# Patient Record
Sex: FEMALE | Race: WHITE
[De-identification: names, ages, dates, MRNs, and addresses within clinical notes are randomized per-mention and may not be internally consistent; named-entity substitution may affect disease eponyms.]

---

## 2020-07-24 ENCOUNTER — HOSPITAL ENCOUNTER (OUTPATIENT)
Dept: HOSPITAL 62 - LC | Age: 25
Discharge: HOME | End: 2020-07-24
Attending: OBSTETRICS & GYNECOLOGY
Payer: MEDICAID

## 2020-07-24 DIAGNOSIS — O47.1: ICD-10-CM

## 2020-07-24 DIAGNOSIS — Z3A.39: ICD-10-CM

## 2020-07-24 DIAGNOSIS — O16.3: Primary | ICD-10-CM

## 2020-07-24 LAB
ALBUMIN SERPL-MCNC: 3.4 G/DL (ref 3.5–5)
ALP SERPL-CCNC: 109 U/L (ref 38–126)
ANION GAP SERPL CALC-SCNC: 7 MMOL/L (ref 5–19)
APPEARANCE UR: (no result)
APTT PPP: YELLOW S
AST SERPL-CCNC: 14 U/L (ref 14–36)
BARBITURATES UR QL SCN: NEGATIVE
BILIRUB DIRECT SERPL-MCNC: 0 MG/DL (ref 0–0.4)
BILIRUB SERPL-MCNC: 0.3 MG/DL (ref 0.2–1.3)
BILIRUB UR QL STRIP: NEGATIVE
BUN SERPL-MCNC: 7 MG/DL (ref 7–20)
CALCIUM: 9.2 MG/DL (ref 8.4–10.2)
CHLORIDE SERPL-SCNC: 108 MMOL/L (ref 98–107)
CO2 SERPL-SCNC: 20 MMOL/L (ref 22–30)
CREAT UR-MCNC: 201.7 MG/DL (ref 16–327)
ERYTHROCYTE [DISTWIDTH] IN BLOOD BY AUTOMATED COUNT: 14.1 % (ref 11.5–14)
GLUCOSE SERPL-MCNC: 78 MG/DL (ref 75–110)
GLUCOSE UR STRIP-MCNC: NEGATIVE MG/DL
HCT VFR BLD CALC: 38 % (ref 36–47)
HGB BLD-MCNC: 13 G/DL (ref 12–15.5)
KETONES UR STRIP-MCNC: NEGATIVE MG/DL
MCH RBC QN AUTO: 28.4 PG (ref 27–33.4)
MCHC RBC AUTO-ENTMCNC: 34.2 G/DL (ref 32–36)
MCV RBC AUTO: 83 FL (ref 80–97)
METHADONE UR QL SCN: NEGATIVE
NITRITE UR QL STRIP: NEGATIVE
PCP UR QL SCN: NEGATIVE
PH UR STRIP: 5 [PH] (ref 5–9)
PLATELET # BLD: 236 10^3/UL (ref 150–450)
POTASSIUM SERPL-SCNC: 4.1 MMOL/L (ref 3.6–5)
PROT SERPL-MCNC: 6.3 G/DL (ref 6.3–8.2)
PROT UR STRIP-MCNC: 30 MG/DL
PROT UR STRIP-MCNC: 8.8 MG/DL (ref ?–12)
RBC # BLD AUTO: 4.58 10^6/UL (ref 3.72–5.28)
SP GR UR STRIP: 1.03
UR PRO/CREAT RATIO RESULT: 0 MG/MG (ref 0–0.2)
URATE SERPL-MCNC: 6.2 MG/DL (ref 2.5–6.2)
URINE AMPHETAMINES SCREEN: NEGATIVE
URINE BENZODIAZEPINES SCREEN: NEGATIVE
URINE COCAINE SCREEN: NEGATIVE
URINE MARIJUANA (THC) SCREEN: (no result)
UROBILINOGEN UR-MCNC: 2 MG/DL (ref ?–2)
WBC # BLD AUTO: 10.3 10^3/UL (ref 4–10.5)

## 2020-07-24 PROCEDURE — 36415 COLL VENOUS BLD VENIPUNCTURE: CPT

## 2020-07-24 PROCEDURE — 80053 COMPREHEN METABOLIC PANEL: CPT

## 2020-07-24 PROCEDURE — 80307 DRUG TEST PRSMV CHEM ANLYZR: CPT

## 2020-07-24 PROCEDURE — G0480 DRUG TEST DEF 1-7 CLASSES: HCPCS

## 2020-07-24 PROCEDURE — 59025 FETAL NON-STRESS TEST: CPT

## 2020-07-24 PROCEDURE — 80349 CANNABINOIDS NATURAL: CPT

## 2020-07-24 PROCEDURE — 84156 ASSAY OF PROTEIN URINE: CPT

## 2020-07-24 PROCEDURE — 82570 ASSAY OF URINE CREATININE: CPT

## 2020-07-24 PROCEDURE — 84550 ASSAY OF BLOOD/URIC ACID: CPT

## 2020-07-24 PROCEDURE — 81005 URINALYSIS: CPT

## 2020-07-24 PROCEDURE — 85027 COMPLETE CBC AUTOMATED: CPT

## 2020-07-24 NOTE — NON STRESS TEST REPORT
=================================================================

Non Stress Test

=================================================================

Datetime Report Generated by CPN: 07/24/2020 16:29

   

   

=================================================================

DEMOGRAPHIC

=================================================================

   

Test Number:  1

   

=================================================================

INDICATION

=================================================================

   

Indication for Study (NST) Other:  PIH workup

   

=================================================================

VITAL SIGNS

=================================================================

   

Temperature - NST:  97.5

Pulse - NST:  88

RESP - NST:  18

NBPSYS NST:  138

NBPDIA NST:  67

   

=================================================================

MONITORING

=================================================================

   

Monitor Explained:  Monitor Explained; Test Explained; Patient

   Verbalized Understanding

Time on Monitor:  07/24/2020 15:13

Time off Monitor:  07/24/2020 16:19

NST Duration:  66

   

=================================================================

NST INTERVENTIONS

=================================================================

   

NST Interventions:  PO Hydration; Reposition Patient

Physician Notified NST:  Dr Taylor

BABY A:  [id number]

   

=================================================================

BABY A

=================================================================

   

Fetal Movement :  Present

Contraction Frequency :  irregular

FHR Baseline :  130

Accelerations :  15X15

Decelerations :  None

Variability :  Moderate 6-25bpm

NST Review:  Meets Criteria for Reactive NST

NST Review and Verified By :  JULISSA Eng RN

NST Results:  Reactive

   

=================================================================

NST REPORT

=================================================================

   

Report Trigger:  Send Report

## 2020-07-29 ENCOUNTER — HOSPITAL ENCOUNTER (INPATIENT)
Dept: HOSPITAL 62 - LR | Age: 25
LOS: 5 days | Discharge: HOME | End: 2020-08-03
Attending: OBSTETRICS & GYNECOLOGY | Admitting: OBSTETRICS & GYNECOLOGY
Payer: MEDICAID

## 2020-07-29 DIAGNOSIS — Z3A.39: ICD-10-CM

## 2020-07-29 DIAGNOSIS — Z87.891: ICD-10-CM

## 2020-07-29 DIAGNOSIS — Z91.048: ICD-10-CM

## 2020-07-29 DIAGNOSIS — O61.0: ICD-10-CM

## 2020-07-29 LAB
ADD MANUAL DIFF: NO
ALBUMIN SERPL-MCNC: 3.6 G/DL (ref 3.5–5)
ALP SERPL-CCNC: 113 U/L (ref 38–126)
ANION GAP SERPL CALC-SCNC: 9 MMOL/L (ref 5–19)
APPEARANCE UR: (no result)
APTT PPP: YELLOW S
AST SERPL-CCNC: 15 U/L (ref 14–36)
BARBITURATES UR QL SCN: NEGATIVE
BASOPHILS # BLD AUTO: 0 10^3/UL (ref 0–0.2)
BASOPHILS NFR BLD AUTO: 0.3 % (ref 0–2)
BILIRUB DIRECT SERPL-MCNC: 0 MG/DL (ref 0–0.4)
BILIRUB SERPL-MCNC: 0.3 MG/DL (ref 0.2–1.3)
BILIRUB UR QL STRIP: NEGATIVE
BUN SERPL-MCNC: 5 MG/DL (ref 7–20)
CALCIUM: 9.1 MG/DL (ref 8.4–10.2)
CHLORIDE SERPL-SCNC: 107 MMOL/L (ref 98–107)
CO2 SERPL-SCNC: 19 MMOL/L (ref 22–30)
CREAT UR-MCNC: 168.3 MG/DL (ref 16–327)
EOSINOPHIL # BLD AUTO: 0.1 10^3/UL (ref 0–0.6)
EOSINOPHIL NFR BLD AUTO: 0.9 % (ref 0–6)
ERYTHROCYTE [DISTWIDTH] IN BLOOD BY AUTOMATED COUNT: 14.4 % (ref 11.5–14)
GLUCOSE SERPL-MCNC: 76 MG/DL (ref 75–110)
GLUCOSE UR STRIP-MCNC: NEGATIVE MG/DL
HCT VFR BLD CALC: 37.3 % (ref 36–47)
HGB BLD-MCNC: 12.8 G/DL (ref 12–15.5)
KETONES UR STRIP-MCNC: NEGATIVE MG/DL
LYMPHOCYTES # BLD AUTO: 1.7 10^3/UL (ref 0.5–4.7)
LYMPHOCYTES NFR BLD AUTO: 17 % (ref 13–45)
MCH RBC QN AUTO: 28.4 PG (ref 27–33.4)
MCHC RBC AUTO-ENTMCNC: 34.3 G/DL (ref 32–36)
MCV RBC AUTO: 83 FL (ref 80–97)
METHADONE UR QL SCN: NEGATIVE
MONOCYTES # BLD AUTO: 0.6 10^3/UL (ref 0.1–1.4)
MONOCYTES NFR BLD AUTO: 6.4 % (ref 3–13)
NEUTROPHILS # BLD AUTO: 7.4 10^3/UL (ref 1.7–8.2)
NEUTS SEG NFR BLD AUTO: 75.4 % (ref 42–78)
NITRITE UR QL STRIP: NEGATIVE
PCP UR QL SCN: NEGATIVE
PH UR STRIP: 5 [PH] (ref 5–9)
PLATELET # BLD: 254 10^3/UL (ref 150–450)
POTASSIUM SERPL-SCNC: 4 MMOL/L (ref 3.6–5)
PROT SERPL-MCNC: 6.5 G/DL (ref 6.3–8.2)
PROT UR STRIP-MCNC: 7.9 MG/DL (ref ?–12)
PROT UR STRIP-MCNC: NEGATIVE MG/DL
RBC # BLD AUTO: 4.5 10^6/UL (ref 3.72–5.28)
SP GR UR STRIP: 1.02
TOTAL CELLS COUNTED % (AUTO): 100 %
UR PRO/CREAT RATIO RESULT: 0 MG/MG (ref 0–0.2)
URATE SERPL-MCNC: 6.7 MG/DL (ref 2.5–6.2)
URINE AMPHETAMINES SCREEN: NEGATIVE
URINE BENZODIAZEPINES SCREEN: NEGATIVE
URINE COCAINE SCREEN: NEGATIVE
URINE MARIJUANA (THC) SCREEN: (no result)
UROBILINOGEN UR-MCNC: 2 MG/DL (ref ?–2)
WBC # BLD AUTO: 9.8 10^3/UL (ref 4–10.5)

## 2020-07-29 PROCEDURE — 83615 LACTATE (LD) (LDH) ENZYME: CPT

## 2020-07-29 PROCEDURE — 84550 ASSAY OF BLOOD/URIC ACID: CPT

## 2020-07-29 PROCEDURE — 85027 COMPLETE CBC AUTOMATED: CPT

## 2020-07-29 PROCEDURE — 84156 ASSAY OF PROTEIN URINE: CPT

## 2020-07-29 PROCEDURE — 85025 COMPLETE CBC W/AUTO DIFF WBC: CPT

## 2020-07-29 PROCEDURE — 86850 RBC ANTIBODY SCREEN: CPT

## 2020-07-29 PROCEDURE — 81001 URINALYSIS AUTO W/SCOPE: CPT

## 2020-07-29 PROCEDURE — 94799 UNLISTED PULMONARY SVC/PX: CPT

## 2020-07-29 PROCEDURE — 86592 SYPHILIS TEST NON-TREP QUAL: CPT

## 2020-07-29 PROCEDURE — 80307 DRUG TEST PRSMV CHEM ANLYZR: CPT

## 2020-07-29 PROCEDURE — G0480 DRUG TEST DEF 1-7 CLASSES: HCPCS

## 2020-07-29 PROCEDURE — 94760 N-INVAS EAR/PLS OXIMETRY 1: CPT

## 2020-07-29 PROCEDURE — 80349 CANNABINOIDS NATURAL: CPT

## 2020-07-29 PROCEDURE — 82570 ASSAY OF URINE CREATININE: CPT

## 2020-07-29 PROCEDURE — C1758 CATHETER, URETERAL: HCPCS

## 2020-07-29 PROCEDURE — 80053 COMPREHEN METABOLIC PANEL: CPT

## 2020-07-29 PROCEDURE — 86900 BLOOD TYPING SEROLOGIC ABO: CPT

## 2020-07-29 PROCEDURE — 86901 BLOOD TYPING SEROLOGIC RH(D): CPT

## 2020-07-29 PROCEDURE — 36415 COLL VENOUS BLD VENIPUNCTURE: CPT

## 2020-07-30 NOTE — L&D PROGRESS NOTES
=================================================================

PROGRESS NOTES

=================================================================

Datetime Report Generated by CPN: 07/30/2020 13:41

   

   

=================================================================

PROGRESS NOTE

=================================================================

   

Impression:  Reassuring Fetal Heart Rate

Procedures:  Sterile Vag Exam

Plan:  Continue Present Management; Induction

Plan Other:  Start Pitocin

Informed Consent Obtained:  Vaginal Delivery; Induction of Labor;

   Risks, Benefits and Alternatives Discussed

Vital Signs :  Reviewed; Within Normal Limits

Comment:  Pitocin infusing, pt feeling contractions but remains

   comfortable. VE, difficult to locate cervix on exam, unable to AROM

   at this time.  Will continue with Pitocin, position changes

   encouraged

   

=================================================================

VAGINAL EXAM

=================================================================

   

Dilatation:  0

Effacement:  50

Station:  -2

Contractions:  rare

   

=================================================================

LAST VAGINAL EXAM-NURSING

=================================================================

   

Nursing Exam Dilitation:  1.0

Nursing Exam Effacement:  80

Nursing Exam Station:  -1

Nursing Exam Contractions:  uterus palpates soft, denies contractions. 

   Reviewed plan with assigned nurse to assess/troubleshoot  Linn

   device before increasing Pitocin

   

=================================================================

MEMBRANES

=================================================================

   

Membranes:  Intact

   

=================================================================

FETUS A

=================================================================

   

FHR - Baseline:  145

Monitoring:  External US

Variability:  Moderate 6-25bpm

Accelerations:  15X15

Decelerations:  None

Fetal Presentation:  Vertex

   

=================================================================

SIGNATURE

=================================================================

   

SIGNATURE:  [id number];14,[id number];10,[id number]

Assignment:  Filipe Harrison MD

Signature:  Electronically signed by Arianna Feliciano CNM on 7/30/2020

   at 13:40  with User ID: [email or web address]

:  Electronically signed by Arianna Feliciano CNM on 7/30/2020 at 13:40 

   with User ID: [email or web address]

## 2020-07-30 NOTE — L&D PROGRESS NOTES
=================================================================

PROGRESS NOTES

=================================================================

Datetime Report Generated by CPN: 07/30/2020 15:52

   

   

=================================================================

PROGRESS NOTE

=================================================================

   

Impression:  Reassuring Fetal Heart Rate

Procedures:  Sterile Vag Exam

Plan:  Induction

Plan Other:  Start Pitocin

Informed Consent Obtained:  Vaginal Delivery; Induction of Labor;

   Risks, Benefits and Alternatives Discussed

Vital Signs :  Reviewed; Within Normal Limits

Comment:  IOL for GHTN, pt doing well, Pitocin infusing. VE tight 1 cm/

   90/ -1, very anterior cervix.  Posiiton changes encouraged, continue

   w/ Pitocin

   

=================================================================

VAGINAL EXAM

=================================================================

   

Dilatation:  0

Effacement:  50

Station:  -2

Contractions:  rare

   

=================================================================

LAST VAGINAL EXAM-NURSING

=================================================================

   

Nursing Exam Dilitation:  1.0

Nursing Exam Effacement:  90

Nursing Exam Station:  -1

Nursing Exam Contractions:  uterus palpates soft, denies contractions. 

   Reviewed plan with assigned nurse to assess/troubleshoot  Linn

   device before increasing Pitocin

   

=================================================================

MEMBRANES

=================================================================

   

Membranes:  Intact

   

=================================================================

FETUS A

=================================================================

   

FHR - Baseline:  150

Monitoring:  External US

Variability:  Moderate 6-25bpm

Accelerations:  15X15

Decelerations:  None

Fetal Presentation:  Vertex

   

=================================================================

SIGNATURE

=================================================================

   

SIGNATURE:  [id number];14,[id number];13,[id number]

Assignment:  Filipe Harrison MD

Signature:  Electronically signed by Arianna Feliciano CNM on 7/30/2020

   at 15:51  with User ID: [email or web address]

:  Electronically signed by Arianna Feliciano CNM on 7/30/2020 at 15:51 

   with User ID: [email or web address]

## 2020-07-30 NOTE — L&D PROGRESS NOTES
=================================================================

PROGRESS NOTES

=================================================================

Datetime Report Generated by CPN: 2020 08:49

   

   

=================================================================

PROGRESS NOTE

=================================================================

   

Impression:  Reassuring Fetal Heart Rate

Procedures:  Sterile Vag Exam

Plan:  Induction

Plan Other:  Start Pitocin

Informed Consent Obtained:  Vaginal Delivery; Induction of Labor;

   Risks, Benefits and Alternatives Discussed

Vital Signs :  Reviewed; Within Normal Limits

Comment:   at 39.5 here for IOL due to GHTN. s/p Cervidil

   overnight. Pt doing well this morning. No complaints. GBS negative.

   VE FT/80/-1, vtx, soft cervix. Pt unsure of epidural when in active

   labor. Pain management options reviewed. Plan to start Pitocin, then

   AROM w/ cervical change. Attending MD is Dr Harrison today

   

=================================================================

VAGINAL EXAM

=================================================================

   

Dilatation:  0

Effacement:  50

Station:  -2

Contractions:  rare

   

=================================================================

LAST VAGINAL EXAM-NURSING

=================================================================

   

Nursing Exam Dilitation:  1.0

Nursing Exam Effacement:  80

Nursing Exam Station:  -1

Nursing Exam Contractions:  Patient denies contractions and pain at

   this time. 

   

=================================================================

MEMBRANES

=================================================================

   

Membranes:  Intact

   

=================================================================

FETUS A

=================================================================

   

FHR - Baseline:  145

Monitoring:  External US

Variability:  Moderate 6-25bpm

Accelerations:  15X15

Decelerations:  None

Fetal Presentation:  Vertex

   

=================================================================

SIGNATURE

=================================================================

   

SIGNATURE:  [id number];14,[id number];13,[id number]

Assignment:  Winnie Bustamante MD

Signature:  Electronically signed by Arianna Feliciano CNM on 2020

   at 08:48  with User ID: [email or web address]

:  Electronically signed by Arianna Feliciano CNM on 2020 at 08:48 

   with User ID: [email or web address]

## 2020-07-31 LAB
ADD MANUAL DIFF: NO
ALBUMIN SERPL-MCNC: 3.3 G/DL (ref 3.5–5)
ALP SERPL-CCNC: 113 U/L (ref 38–126)
ANION GAP SERPL CALC-SCNC: 7 MMOL/L (ref 5–19)
AST SERPL-CCNC: 14 U/L (ref 14–36)
BASOPHILS # BLD AUTO: 0 10^3/UL (ref 0–0.2)
BASOPHILS NFR BLD AUTO: 0.2 % (ref 0–2)
BILIRUB DIRECT SERPL-MCNC: 0 MG/DL (ref 0–0.4)
BILIRUB SERPL-MCNC: 0.5 MG/DL (ref 0.2–1.3)
BUN SERPL-MCNC: 7 MG/DL (ref 7–20)
CALCIUM: 8.9 MG/DL (ref 8.4–10.2)
CHLORIDE SERPL-SCNC: 107 MMOL/L (ref 98–107)
CO2 SERPL-SCNC: 20 MMOL/L (ref 22–30)
EOSINOPHIL # BLD AUTO: 0.1 10^3/UL (ref 0–0.6)
EOSINOPHIL NFR BLD AUTO: 0.8 % (ref 0–6)
ERYTHROCYTE [DISTWIDTH] IN BLOOD BY AUTOMATED COUNT: 14 % (ref 11.5–14)
GLUCOSE SERPL-MCNC: 78 MG/DL (ref 75–110)
HCT VFR BLD CALC: 38 % (ref 36–47)
HGB BLD-MCNC: 13.2 G/DL (ref 12–15.5)
LYMPHOCYTES # BLD AUTO: 1.4 10^3/UL (ref 0.5–4.7)
LYMPHOCYTES NFR BLD AUTO: 15.1 % (ref 13–45)
MCH RBC QN AUTO: 28.4 PG (ref 27–33.4)
MCHC RBC AUTO-ENTMCNC: 34.7 G/DL (ref 32–36)
MCV RBC AUTO: 82 FL (ref 80–97)
MONOCYTES # BLD AUTO: 0.6 10^3/UL (ref 0.1–1.4)
MONOCYTES NFR BLD AUTO: 5.9 % (ref 3–13)
NEUTROPHILS # BLD AUTO: 7.4 10^3/UL (ref 1.7–8.2)
NEUTS SEG NFR BLD AUTO: 78 % (ref 42–78)
PLATELET # BLD: 231 10^3/UL (ref 150–450)
POTASSIUM SERPL-SCNC: 4 MMOL/L (ref 3.6–5)
PROT SERPL-MCNC: 6.3 G/DL (ref 6.3–8.2)
RBC # BLD AUTO: 4.65 10^6/UL (ref 3.72–5.28)
TOTAL CELLS COUNTED % (AUTO): 100 %
URATE SERPL-MCNC: 6.2 MG/DL (ref 2.5–6.2)
WBC # BLD AUTO: 9.5 10^3/UL (ref 4–10.5)

## 2020-07-31 PROCEDURE — 3E033VJ INTRODUCTION OF OTHER HORMONE INTO PERIPHERAL VEIN, PERCUTANEOUS APPROACH: ICD-10-PCS | Performed by: OBSTETRICS & GYNECOLOGY

## 2020-07-31 PROCEDURE — 3E0P7VZ INTRODUCTION OF HORMONE INTO FEMALE REPRODUCTIVE, VIA NATURAL OR ARTIFICIAL OPENING: ICD-10-PCS | Performed by: OBSTETRICS & GYNECOLOGY

## 2020-07-31 NOTE — L&D PROGRESS NOTES
=================================================================

PROGRESS NOTES

=================================================================

Datetime Report Generated by CPN: 07/31/2020 15:29

   

   

=================================================================

PROGRESS NOTE

=================================================================

   

Impression:  Reassuring Fetal Heart Rate

Impression Other:  not in labor

Procedures:  Sterile Vag Exam

Procedures- Other:  removed hough balloon and turned off pitocin

Plan:  Induction

Plan Other:  Start Pitocin

Informed Consent Obtained:  Vaginal Delivery; Induction of Labor;

   Risks, Benefits and Alternatives Discussed

Vital Signs :  Reviewed; Within Normal Limits

Comment:  after 5 hours of hough balloon and 3 hours of pitocin, no

   cervical change and only aftab rarely. FB deflated and

   removed. pitocin turned off. BP normal and no sx pre-e.  P:cytotec,

   discuss with dr england

   

=================================================================

VAGINAL EXAM

=================================================================

   

Dilatation:  1

Effacement:  90

Station:  -2

Contractions:  rare

   

=================================================================

LAST VAGINAL EXAM-NURSING

=================================================================

   

Nursing Exam Dilitation:  1.0

Nursing Exam Effacement:  90

Nursing Exam Station:  -2

Nursing Exam Contractions:  UTD due to wireless monitor not  working;

   RN at bedside troubleshooting monitor 

   

=================================================================

MEMBRANES

=================================================================

   

Membranes:  Bulging

   

=================================================================

FETUS A

=================================================================

   

FHR - Baseline:  140

Monitoring:  External US

Variability:  Moderate 6-25bpm

Accelerations:  10X10

Decelerations:  None

FHR Category:  Category I

:  40.0

Fetal Presentation:  Vertex

   

=================================================================

SIGNATURE

=================================================================

   

SIGNATURE:  13,[id number];14,[id number];10,[id number]

Assignment:  Iris England MD

Signature:  Electronically signed by Isela Tafoya CNM  on 7/31/2020 at

   15:28  with User ID: [email or web address]

:  Electronically signed by Isela Tafoya CNM  on 7/31/2020 at 15:28  with

   User ID: [email or web address]

## 2020-08-01 RX ADMIN — DOCUSATE SODIUM SCH MG: 100 CAPSULE, LIQUID FILLED ORAL at 17:19

## 2020-08-01 RX ADMIN — KETOROLAC TROMETHAMINE SCH MG: 30 INJECTION, SOLUTION INTRAMUSCULAR at 14:44

## 2020-08-01 RX ADMIN — KETOROLAC TROMETHAMINE SCH MG: 30 INJECTION, SOLUTION INTRAMUSCULAR at 21:48

## 2020-08-01 RX ADMIN — DOCUSATE SODIUM SCH: 100 CAPSULE, LIQUID FILLED ORAL at 11:40

## 2020-08-01 RX ADMIN — Medication SCH: at 11:40

## 2020-08-01 NOTE — OPERATIVE REPORT
Operative Report


DATE OF SURGERY: 20


PREOPERATIVE DIAGNOSIS: IUP @ 40 1/7 wks, gestational hypertension, failed estella

ction


POSTOPERATIVE DIAGNOSIS: same


OPERATION: Primary low transverse hysterotomy  section


SURGEON: LORETTA MESA


ANESTHESIA: Spinal


COMPLICATIONS: 





None


ESTIMATED BLOOD LOSS: 800 cc


INTRAOPERATIVE FINDINGS: Infant cephalic presentation Apgars of 8 and 9


PROCEDURE: 








PROCEDURE IN DETAIL:


The patient was taken to the operating room, prepared and draped in a


normal sterile fashion in a supine position with a leftward tilt. A


transverse skin incision was made with a scalpel and carried through to


the underlying layer of fascia with the same scalpel. The fascia was


excised in the midline and extended laterally with Rivka. The fascia was


then dissected from the rectus muscle sharply with Rivka and the rectus


muscle was divided and the peritoneal cavity was entered sharply with the


same Metzenbaum. With good visualization of the bladder and the uterus


the bladder blade was inserted. The hysterotomy was nicked with a scalpel


and extended laterally with surgeon finger fraction. The infant was then


delivered atraumatically. The nose and mouth were suctioned with a


suction bulb, the cord was clamped and cut and handed off to awaiting


pediatricians. Cord blood was collected. The placenta was removed


manually. The uterus was exteriorized and cleared of clots and debris.


The hysterotomy was closed with 0 Monocryl in a running, locked fashion.


A second layer of the same suture was used to imbricate to ensure


hemostasis. The uterus was returned to the abdomen and peritoneal cavity


was cleared of clots and debris. The rectus muscle and peritoneum were


repaired with mattress stitch of 2-0 Chromic. The fascia was closed with


0-Vicryl. The subcutaneous layer was closed with plain catgut and the


skin was closed with 4-0 Vicryl. The patient tolerated the procedure


well. Sponge, lap, and needle counts correct x2 and the patient was taken


to recovery in stable condition.

## 2020-08-01 NOTE — DELIVERY SUMMARY
=================================================================

Del Sum A-C

=================================================================

Datetime Report Generated by CPN: 2020 19:38

   

   

=================================================================

DELIVERY PERSONNEL

=================================================================

   

DELIVERY PERSONNEL:  [id number]

Delivery Doctor::  Iris Craig MD

Anesthesiologist::  Margie Patricio MD

CRNA::  Oniel Beyer CRNA

Labor and Delivery Nurse::  Magdalena Miramontes RN

Circulator::  Magdalena Miramontes RN

 Nurse Practitioner::  JEWEL Reyes

Nursery Nurse::  Darline Pham RN

Scrub Tech/CNA:  ST Ervin

Scrub Tech/CNA:  Anabela Coe CST

Additional Personnel: :  Trudy Meyers RN

   

=================================================================

MATERNAL INFORMATION

=================================================================

   

Delivery Anesthesia:  Spinal

Medications After Delivery:  Pitocin 30 Units in 500ml NS/D5W

Delivery QBL:  820

Maternal Complications:  Other

Other Maternal Complications:  Failed Induction

   

=================================================================

LABOR SUMMARY

=================================================================

   

EDC:  2020 00:00

No. Babies in Womb:  1

 Attempted:  No

Labor Anesthesia:  None

   

=================================================================

LABOR INFORMATION

=================================================================

   

Reason for Induction:  Gestational Hypertension

Cervical Ripening Agents:  Cervidil; Ramires Balloon; Cytotec @

Other Ripening Agents:  cervidil removed by pt 

      

Oxytocin:  Induction

Group B Beta Strep:  Negative

Antibiotics # of Doses:  0

Antibiotics Time of Last Dose:  N/A

Name of Antibiotic Given:  N/A

Steroids Given:  None

Reason Steroids Not Administered:  Not Applicable

   

=================================================================

MEMBRANES

=================================================================

   

Membranes Rupture Method:  Spontaneous

Rupture of Membranes:  2020 00:11

Length of Rupture (hr):  6.23

Amniotic Fluid Color:  Clear

Amniotic Fluid Amount:  Small

   

=================================================================

STAGES OF LABOR

=================================================================

   

Stage 3 hr:  0

Stage 3 min:  1

   

=================================================================

VAGINAL DELIVERY

=================================================================

   

Episiotomy:  None

Laceration #1:  None

Laceration Extension #1:  N/A

Laceration Repair:  Not Applicable

Sponge Count Correct:  N/A

Sharps Count Correct:  N/A

   

=================================================================

CSECTION DELIVERY

=================================================================

   

Primary Indication:  Failed Induction

CSection Urgency:  Non-Scheduled

CSection Incidence:  Primary

Labor:  No Labor

Elective:  Nonelective

CSection Incision:  Lower Uterine Transverse

   

=================================================================

BABY A INFORMATION

=================================================================

   

Infant Delivery Date/Time:  2020 06:25

Method of Delivery:  

Nurse Controlled Delivery:  No

Born in Route :  No

:  N/A

Forceps:  N/A

Vacuum Extraction:  N/A

Shoulder Dystocia :  No

   

=================================================================

PRESENTATION/POSITION BABY A

=================================================================

   

Presentation:  Cephalic

Cephalic Presentation:  Vertex

Breech Presentation:  N/A

   

=================================================================

PLACENTA INFORMATION BABY A

=================================================================

   

Placenta Delivery Time :  2020 06:26

Placenta Method of Delivery:  Manual Removal

Placenta Status:  Delivered

   

=================================================================

APGAR SCORES BABY A

=================================================================

   

Heart Rate 1 min:  >100 bpm

Resp Effort 1 min:  Good Cry

Reflex Irritability 1 min:  Cough or Sneeze or Pulls Away

Muscle Tone 1 min:  Active Motion

Color 1 min:  Body Pink, Extremities Blue

APGAR SCORE 1 MIN:  9

Heart Rate 5 min:  >100 bpm

Resp Effort 5 min:  Good Cry

Reflex Irritability 5 min:  Cough or Sneeze or Pulls Away

Muscle Tone 5 min:  Active Motion

Color 5 min:  Body Pink, Extremities Blue

APGAR SCORE 5 MIN:  9

   

=================================================================

INFANT INFORMATION BABY A

=================================================================

   

Gestational Age at Delivery:  40.0

Gestational Status:  Full Term- 39- 40.6 Weeks

Infant Outcome :  Liveborn

Infant Condition :  Stable

Infant Sex:  Male

   

=================================================================

IDENTIFICATION BABY A

=================================================================

   

Infant Verification Date/Time:  2020 06:39

ID Band Number:  [id number]

Mother's Name Verified:  Yes

Infant Medical Record Number:  [id number]

RN Verifying Infant:  NONI FlemingAxel HERNANDEZ

Additional Verifying Personnel:  ROMEO Meyers RN

   

=================================================================

WEIGHT/LENGTH BABY A

=================================================================

   

Infant Birthweight (gm):  2920

Infant Weight (lb):  6

Infant Weight (oz):  7

Infant Length (in):  19.00

Infant Length (cm):  48.26

   

=================================================================

CORD INFORMATION BABY A

=================================================================

   

No. Cord Vessels:  3

Nuchal Cord :  N/A

Cord Blood Taken:  Yes-For Eval (Mom's Blood Type - or O+)

Infant Suction:  Mouth; Nose

   

=================================================================

ASSESSMENT BABY A

=================================================================

   

Infant Complications:  None

Physical Findings at Delivery:  Within Normal Limits

Physical Findings- Other:  See full nursery RN assessment

Infant Respirations:  Appears Normal

Neonatologist/ALS Called :  No

Infant Care By:  JESUS Pham RN and JESUS Vaughan NNP

Transferred To:  [facility]

   

=================================================================

BABY B INFORMATION

=================================================================

   

 :  N/A

   

=================================================================

SIGNATURES

=================================================================

   

:  I was personally available for consultation and serving as

   supervising physician for the MLP.

## 2020-08-01 NOTE — PDOC PROGRESS REPORT
Subjective


Progress Note for:: 20


Subjective:: 





Indication for procedure.  IUP at 40 weeks and 1 day stational hypertension.  

Patient had been present in the L&D for approximately 3 days and had had 

multiple attempts at inducing her labor which were not successful.  Patient 

underwent Cervidil for ripening x2 a 12-hour trial of Pitocin as well as another

12-hour trial of Pitocin with Ramires bulb in place she then had 2 doses of 

Cytotec for cervical ripening brains ruptured she failed to progress in labor 

passed a 1 cm dilation of the cervix.  The options were discussed with the 

patient and she and her  opted for  section understanding the 

risk involved with surgery and the likely need for repeat  with 

subsequent childbearing based on her lack of response to induction efforts with 

this pregnancy


Reason For Visit: 


PREGNANCY/INDUCTION 


Addendum to operative report








Result


Laboratory Results: 


                                        





                                 20 11:53 





                                 20 11:53 





                                        











  20





  11:53 11:53


 


WBC  9.5 


 


RBC  4.65 


 


Hgb  13.2 


 


Hct  38.0 


 


MCV  82 


 


MCH  28.4 


 


MCHC  34.7 


 


RDW  14.0 


 


Plt Count  231 


 


Seg Neutrophils %  78.0 


 


Sodium   133.7 L


 


Potassium   4.0


 


Chloride   107


 


Carbon Dioxide   20 L


 


Anion Gap   7


 


BUN   7


 


Creatinine   0.43 L


 


Est GFR (African Amer)   > 60


 


Glucose   78


 


Uric Acid   6.2


 


Calcium   8.9


 


Total Bilirubin   0.5


 


AST   14


 


Alkaline Phosphatase   113


 


Total Protein   6.3


 


Albumin   3.3 L














Assessment & Plan





- Time


Time Spent with patient: 15-24 minutes


Anticipated discharge: Home


Anticipated DC Timeframe: within 48 hours

## 2020-08-02 LAB
ERYTHROCYTE [DISTWIDTH] IN BLOOD BY AUTOMATED COUNT: 14.2 % (ref 11.5–14)
HCT VFR BLD CALC: 24.8 % (ref 36–47)
HGB BLD-MCNC: 8.4 G/DL (ref 12–15.5)
MCH RBC QN AUTO: 28.4 PG (ref 27–33.4)
MCHC RBC AUTO-ENTMCNC: 33.9 G/DL (ref 32–36)
MCV RBC AUTO: 84 FL (ref 80–97)
PLATELET # BLD: 179 10^3/UL (ref 150–450)
RBC # BLD AUTO: 2.96 10^6/UL (ref 3.72–5.28)
WBC # BLD AUTO: 8.6 10^3/UL (ref 4–10.5)

## 2020-08-02 RX ADMIN — IBUPROFEN SCH MG: 800 TABLET, FILM COATED ORAL at 15:08

## 2020-08-02 RX ADMIN — DOCUSATE SODIUM SCH MG: 100 CAPSULE, LIQUID FILLED ORAL at 18:25

## 2020-08-02 RX ADMIN — FERROUS SULFATE TAB 325 MG (65 MG ELEMENTAL FE) SCH MG: 325 (65 FE) TAB at 18:25

## 2020-08-02 RX ADMIN — IBUPROFEN SCH: 800 TABLET, FILM COATED ORAL at 18:36

## 2020-08-02 RX ADMIN — DOCUSATE SODIUM SCH MG: 100 CAPSULE, LIQUID FILLED ORAL at 10:20

## 2020-08-02 RX ADMIN — IBUPROFEN SCH MG: 800 TABLET, FILM COATED ORAL at 21:33

## 2020-08-02 RX ADMIN — IBUPROFEN SCH MG: 800 TABLET, FILM COATED ORAL at 05:34

## 2020-08-02 RX ADMIN — Medication SCH CAP: at 10:20

## 2020-08-02 NOTE — PDOC PROGRESS REPORT
Subjective-OB


Progress Note for:: 20


Subjective: 





reports bleeding slowing, pain controlled with current meds. denies needs, 

+passing gas





Physical Exam (OB)


Vital Signs: 


                                        











Temp Pulse Resp BP Pulse Ox


 


 97.9 F   100   16   135/79 H  100 


 


 20 08:20  20 08:20  20 08:20  20 08:20  20 08:20








                                 Intake & Output











 20





 06:59 06:59 06:59


 


Intake Total  1000 500


 


Output Total  1550 


 


Balance  -550 500














- 


Dressing Removed: No - drainage outlined


Incision: Dressing


Closure Type: opsite





- Abdomen


Description: Tender, Soft


Hernia Present: No


Fundal Description: Firm, Midline


Fundal Height: u/u - u/2





- Abdominal


Distension: No distension





- Extremities


Lower extremities: Larry's sign - neg


Calf: Normal, Nontender





Objective-Diagnostic


Laboratory: 


                                        





                                 20 06:03 





                                 20 11:53 





                                        











  20





  06:03


 


WBC  8.6


 


RBC  2.96 L


 


Hgb  8.4 L D


 


Hct  24.8 L


 


MCV  84


 


MCH  28.4


 


MCHC  33.9


 


RDW  14.2 H


 


Plt Count  179














Assessment and Plan(PN)





- Assessment and Plan


(1) Failed induction of labor, delivered


Is this a current diagnosis for this admission?: Yes   





(2) Gestational hypertension


Qualifiers: 


   Trimester: unspecified trimester   Qualified Code(s): O13.9 - Gestational 

[pregnancy-induced] hypertension without significant proteinuria, unspecified 

trimester   


Is this a current diagnosis for this admission?: Yes   





- Time Spent with Patient


Time with patient: Less than 15 minutes





- Disposition


Anticipated Discharge Disposition: Home, Self Care


Anticipated Discharge Timeframe: within 48 hours

## 2020-08-03 VITALS — DIASTOLIC BLOOD PRESSURE: 80 MMHG | SYSTOLIC BLOOD PRESSURE: 148 MMHG

## 2020-08-03 LAB
ADD MANUAL DIFF: NO
BASOPHILS # BLD AUTO: 0 10^3/UL (ref 0–0.2)
BASOPHILS NFR BLD AUTO: 0.4 % (ref 0–2)
EOSINOPHIL # BLD AUTO: 0.2 10^3/UL (ref 0–0.6)
EOSINOPHIL NFR BLD AUTO: 2.3 % (ref 0–6)
ERYTHROCYTE [DISTWIDTH] IN BLOOD BY AUTOMATED COUNT: 14.6 % (ref 11.5–14)
HCT VFR BLD CALC: 23.9 % (ref 36–47)
HGB BLD-MCNC: 8.2 G/DL (ref 12–15.5)
LYMPHOCYTES # BLD AUTO: 2.1 10^3/UL (ref 0.5–4.7)
LYMPHOCYTES NFR BLD AUTO: 24.2 % (ref 13–45)
MCH RBC QN AUTO: 28.5 PG (ref 27–33.4)
MCHC RBC AUTO-ENTMCNC: 34.4 G/DL (ref 32–36)
MCV RBC AUTO: 83 FL (ref 80–97)
MONOCYTES # BLD AUTO: 0.5 10^3/UL (ref 0.1–1.4)
MONOCYTES NFR BLD AUTO: 5.6 % (ref 3–13)
NEUTROPHILS # BLD AUTO: 5.9 10^3/UL (ref 1.7–8.2)
NEUTS SEG NFR BLD AUTO: 67.5 % (ref 42–78)
PLATELET # BLD: 216 10^3/UL (ref 150–450)
RBC # BLD AUTO: 2.89 10^6/UL (ref 3.72–5.28)
TOTAL CELLS COUNTED % (AUTO): 100 %
WBC # BLD AUTO: 8.7 10^3/UL (ref 4–10.5)

## 2020-08-03 RX ADMIN — FERROUS SULFATE TAB 325 MG (65 MG ELEMENTAL FE) SCH MG: 325 (65 FE) TAB at 09:30

## 2020-08-03 RX ADMIN — IBUPROFEN SCH MG: 800 TABLET, FILM COATED ORAL at 03:02

## 2020-08-03 RX ADMIN — Medication SCH CAP: at 09:29

## 2020-08-03 RX ADMIN — DOCUSATE SODIUM SCH MG: 100 CAPSULE, LIQUID FILLED ORAL at 09:30

## 2020-08-03 RX ADMIN — IBUPROFEN SCH MG: 800 TABLET, FILM COATED ORAL at 09:29

## 2020-08-03 NOTE — PDOC DISCHARGE SUMMARY
Impression





- Admit/DC Date/PCP


Admission Date/Primary Care Provider: 


  20 17:31





  GABE REID MD





Discharge Date: 20





- Discharge Diagnosis


(1)  delivery delivered


Is this a current diagnosis for this admission?: Yes   





(2) Failed induction of labor, delivered


Is this a current diagnosis for this admission?: Yes   





(3) Gestational hypertension


Is this a current diagnosis for this admission?: Yes   





- Additional Information


Resuscitation Status: Full Code


Discharge Diet: Regular


Discharge Activity: Balance Activity w/Rest, No Lifting Over 10 Pounds, No 

Lifting/Push/Pulling, Pelvic Rest, No tub bath


Referrals: 


GABE REID MD [Primary Care Provider] -  (Follow up in 1 week.  Call the 

office and make an appointment.  )


Prescriptions: 


Oxycodone HCl/Acetaminophen [Percocet 5-325 mg Tablet] 1 tab PO Q4HP PRN #30 

tablet


 PRN Reason: For Pain Scale 3-5


Ibuprofen [Motrin 800 mg Tablet] 800 mg PO Q8HP PRN #60 tablet


 PRN Reason: 


Home Medications: 








Prenatal 95/Iron Fum/Folic/Dha [Prenatal + Dha Combo Pack] 1 each PO DAILY 

18 


Calcium Phosphate Trib/Vit D3 [Calcium + Vitamin D3 Gummies] 1 tab PO DAILY 

20 


Ibuprofen [Motrin 800 mg Tablet] 800 mg PO Q8HP PRN #60 tablet 20 


Oxycodone HCl/Acetaminophen [Percocet 5-325 mg Tablet] 1 tab PO Q4HP PRN #30 

tablet 20 











HPI


Reason(s) for Admission: Induction of Labor, PIH


Prenatal Procedures: NST


Intrapartum Procedure(s): Spontaneous Vaginal Delivery, : Low Cervical, 

Transverse





Results


Laboratory Results: 


                                        











WBC  8.7 10^3/uL (4.0-10.5)   20  06:59    


 


RBC  2.89 10^6/uL (3.72-5.28)  L  20  06:59    


 


Hgb  8.2 g/dL (12.0-15.5)  L  20  06:59    


 


Hct  23.9 % (36.0-47.0)  L  20  06:59    


 


MCV  83 fl (80-97)   20  06:59    


 


MCH  28.5 pg (27.0-33.4)   20  06:59    


 


MCHC  34.4 g/dL (32.0-36.0)   20  06:59    


 


RDW  14.6 % (11.5-14.0)  H  20  06:59    


 


Plt Count  216 10^3/uL (150-450)   20  06:59    


 


Lymph % (Auto)  24.2 % (13-45)   20  06:59    


 


Mono % (Auto)  5.6 % (3-13)   20  06:59    


 


Eos % (Auto)  2.3 % (0-6)   20  06:59    


 


Baso % (Auto)  0.4 % (0-2)   20  06:59    


 


Absolute Neuts (auto)  5.9 10^3/uL (1.7-8.2)   20  06:59    


 


Absolute Lymphs (auto)  2.1 10^3/uL (0.5-4.7)   20  06:59    


 


Absolute Monos (auto)  0.5 10^3/uL (0.1-1.4)   20  06:59    


 


Absolute Eos (auto)  0.2 10^3/uL (0.0-0.6)   20  06:59    


 


Absolute Basos (auto)  0.0 10^3/uL (0.0-0.2)   20  06:59    


 


Seg Neutrophils %  67.5 % (42-78)   20  06:59    


 


Sodium  133.7 mmol/L (137-145)  L  20  11:53    


 


Potassium  4.0 mmol/L (3.6-5.0)   20  11:53    


 


Chloride  107 mmol/L ()   20  11:53    


 


Carbon Dioxide  20 mmol/L (22-30)  L  20  11:53    


 


Anion Gap  7  (5-19)   20  11:53    


 


BUN  7 mg/dL (7-20)   20  11:53    


 


Creatinine  0.43 mg/dL (0.52-1.25)  L  20  11:53    


 


Est GFR ( Amer)  > 60  (>60)   20  11:53    


 


Est GFR (MDRD) Non-Af  > 60  (>60)   20  11:53    


 


Glucose  78 mg/dL ()   20  11:53    


 


Uric Acid  6.2 mg/dL (2.5-6.2)   20  11:53    


 


Calcium  8.9 mg/dL (8.4-10.2)   20  11:53    


 


Total Bilirubin  0.5 mg/dL (0.2-1.3)   20  11:53    


 


Direct Bilirubin  0.0 mg/dL (0.0-0.4)   20  11:53    


 


Neonat Total Bilirubin  Not Reportable   20  11:53    


 


Neonat Direct Bilirubin  Not Reportable   20  11:53    


 


Neonat Indirect Bili  Not Reportable   20  11:53    


 


AST  14 U/L (14-36)   20  11:53    


 


ALT  7 U/L (<35)   20  11:53    


 


Alkaline Phosphatase  113 U/L ()   20  11:53    


 


Lactate Dehydrogenase  160 U/L (120-246)   20  11:53    


 


Total Protein  6.3 g/dL (6.3-8.2)   20  11:53    


 


Albumin  3.3 g/dL (3.5-5.0)  L  20  11:53    


 


Urine Color  YELLOW   20  17:40    


 


Urine Appearance  CLOUDY   20  17:40    


 


Urine pH  5.0  (5.0-9.0)   20  17:40    


 


Ur Specific Gravity  1.020   20  17:40    


 


Urine Protein  NEGATIVE mg/dL (NEGATIVE)   20  17:40    


 


Urine Glucose (UA)  NEGATIVE mg/dL (NEGATIVE)   20  17:40    


 


Urine Ketones  NEGATIVE mg/dL (NEGATIVE)   20  17:40    


 


Urine Blood  NEGATIVE  (NEGATIVE)   20  17:40    


 


Urine Nitrite  NEGATIVE  (NEGATIVE)   20  17:40    


 


Urine Bilirubin  NEGATIVE  (NEGATIVE)   20  17:40    


 


Urine Urobilinogen  2.0 mg/dL (<2.0)  H  20  17:40    


 


Ur Leukocyte Esterase  NEGATIVE  (NEGATIVE)   20  17:40    


 


Urine WBC (Auto)  5 /HPF  20  17:40    


 


Urine RBC (Auto)  6 /HPF  20  17:40    


 


Urine Bacteria (Auto)  2+ /HPF  20  17:40    


 


Squamous Epi Cells Auto  13 /HPF  20  17:40    


 


Urine Mucus (Auto)  FEW /LPF  20  17:40    


 


Urine Creatinine  168.3 mg/dL ()   20  17:40    


 


Protein/Creatinin Ratio  0.0 mg/mg (0.0-0.2)   20  17:40    


 


Urine Total Protein  7.9 mg/dL (<12)   20  17:40    


 


Urine Ascorbic Acid  NEGATIVE  (NEGATIVE)   20  17:40    


 


Urine Opiates Screen  NEGATIVE   20  17:40    


 


Urine Methadone Screen  NEGATIVE   20  17:40    


 


Ur Barbiturates Screen  NEGATIVE   20  17:40    


 


Ur Phencyclidine Scrn  NEGATIVE   20  17:40    


 


Ur Amphetamines Screen  NEGATIVE   20  17:40    


 


U Benzodiazepines Scrn  NEGATIVE   20  17:40    


 


Urine Cocaine Screen  NEGATIVE   20  17:40    


 


U Marijuana (THC) Screen  UNCONFIRMED POSITIVE   20  17:40    


 


RPR  NONREACTIVE  (NONREACTIVE)   20  18:08    


 


Blood Type  O POSITIVE   20  18:08    


 


Antibody Screen  NEGATIVE   20  18:08    














Plan


Plan of Treatment: 


f/u at [facility] on Friday for incision check


Time Spent: Less than 30 Minutes

## 2021-03-02 NOTE — ADMISSION PHYSICAL
=================================================================



=================================================================

Datetime Report Generated by CPN: 2020 05:13

   

   

=================================================================

CURRENT ADMISSION

=================================================================

   

Chief Complaint:  Scheduled Induction of Labor

Indication for Induction:  Gestational HTN

Admit Impression :  Term, Intrauterine Pregnancy; No Active Labor;

   Intact Membranes; Induction of Labor

Admit Plan:  Admit to Unit; Initiate Labor Induction Protocol

   

=================================================================

ALLERGIES

=================================================================

   

Medication Allergies:  No

Medication Allergies:  Bleach (Sodium Hypochlorite) (2020)

Latex:  No Latex Allergies

   

=================================================================

OBSTETRICAL HISTORY

=================================================================

   

EDC:  2020 00:00

:  2

Para:  0

Term:  0

:  0

SAB:  1

IAB:  0

Ectopic:  0

Livin

Cesareans:  0

VBACs:  0

Multiple Births:  0

Gestational Diabetes:  No

Rh Sensitization:  No

Incompetent Cervix:  No

ROBERT:  No

Infertility:  No

ART Treatment:  No

Uterine Anomaly:  No

IUGR:  No

Hx Previous C/S:  No

Macrosomia:  No

Hx Loss/Stillborn:  No

PIH:  Yes

Hx  Death:  No

Placenta Previa/Abruption:  No

Depression/PP Depression:  No

PTL/PROM:  No

Post Partum Hemorrhage:  No

Current Pregnancy Procedures:  Ultrasound; NST

Obstetrical History Comments:  G1 - SAB 16 weeks 2019

   G2 - Current

   

=================================================================

***SEE PRENATAL RECORDS***

=================================================================

   

Alcohol:  No

Marijuana :  No

Cocaine:  No

Other Illicit Drugs:  No

Cigarettes:  Former Smoker. [id number]

   

=================================================================

MEDICAL HISTORY

=================================================================

   

Diabetes:  No

Blood Transfusion:  No

Pulmonary Disease (Asthma, TB):  No

Breast Disease:  No

Hypertension:  Yes

Gyn Surgery:  No

Heart Disease:  No

Hosp/Surgery:  No

Autoimmune Disorder:  No

Anesthetic Complications:  No

Kidney Disease:  No

Abnormal Pap Smear:  No

Neuro/Epilepsy:  No

Psychiatric Disorders:  No

Other Medical Diseases:  No

Hepatitis/Liver Disease:  No

Significant Family History:  No

Varicosities/Phlebitis:  No

Trauma/Violence :  No

Thyroid Dysfunction:  No

Medical History Comments:  GHTN

   

=================================================================

INFECTIOUS HISTORY

=================================================================

   

Gonorrhea:  No

Genital Herpes:  No

Chlamydia:  No

Tuberculosis:  No

Syphilis:  No

Hepatitis:  No

HIV/AIDS Exposure:  No

Rash or Viral Illness:  No

HPV:  No

   

=================================================================

PHYSICAL EXAM

=================================================================

   

General:  Normal

HEENT:  Normal

Neurologic:  Normal

Thyroid:  Deferred

Heart:  Normal

Lungs:  Normal

Breast:  Deferred

Back:  Normal

Abdomen:  Normal

Genitourinary Exam:  Normal

Extremities:  Normal

DTRs:  Normal

Pelvic Type:  Adequate

Vital Signs:  Reviewed

   

=================================================================

VAGINAL EXAM

=================================================================

   

Dilatation:  0

Effacement:  50

Station:  -2

Contraction Comments:  rare

   

=================================================================

MEMBRANES

=================================================================

   

Membranes:  Intact

   

=================================================================

FETUS A

=================================================================

   

EGA:  39.5

Monitoring:  External US

FHR- Baseline:  120

Variability:  Moderate 6-25bpm

Accelerations:  15X15

Decelerations:  None

FHR Category:  Category I

Fetal Presentation:  Vertex

Admit Comment:  25yo  at 39+5ega presents for IOL due to GHTN.  H/o

   16wk loss.  Obesity and 1 hr passed.  on 7/15 baby was 6#1oz.  GBS

   negative.  Admit to labor and delivery and IOL for cervidil. 

   Proceed with IOL and anticipate .

   

=================================================================

PLANS FOR LABOR AND DELIVERY

=================================================================

   

Labor and Delivery:  None

Pain Management:  Natural; Medications

Feeding Preference:  Breast

Benefit of Breast Feed Discussed:  Yes

Circumcision:  Yes

   

=================================================================

INFORMED CONSENT

=================================================================

   

Informed Consent Obtained:  Vaginal Delivery; Induction of Labor;

   Risks, Benefits and Alternatives Discussed

Signature:  Electronically signed by Winnie Bustamante MD ([email or web address]) on

   2020 at 05:12  with User ID: [email or web address]] : COVID +\par \par sats high 90's\par afebrile\par still feels very tired, dizzy\par some chest tightness